# Patient Record
Sex: MALE | Race: WHITE | Employment: PART TIME | URBAN - METROPOLITAN AREA
[De-identification: names, ages, dates, MRNs, and addresses within clinical notes are randomized per-mention and may not be internally consistent; named-entity substitution may affect disease eponyms.]

---

## 2021-11-09 ENCOUNTER — OFFICE VISIT (OUTPATIENT)
Dept: FAMILY MEDICINE CLINIC | Age: 21
End: 2021-11-09
Payer: COMMERCIAL

## 2021-11-09 VITALS — TEMPERATURE: 98.3 F | WEIGHT: 135 LBS | HEIGHT: 73 IN | BODY MASS INDEX: 17.89 KG/M2

## 2021-11-09 DIAGNOSIS — J06.9 URI, ACUTE: Primary | ICD-10-CM

## 2021-11-09 DIAGNOSIS — J11.1 INFLUENZA-LIKE ILLNESS: ICD-10-CM

## 2021-11-09 LAB
INFLUENZA A ANTIBODY: NORMAL
INFLUENZA B ANTIBODY: NORMAL
Lab: NORMAL
PERFORMING INSTRUMENT: NORMAL
QC PASS/FAIL: NORMAL
S PYO AG THROAT QL: NORMAL
SARS-COV-2, POC: NORMAL

## 2021-11-09 PROCEDURE — 99441 PR PHYS/QHP TELEPHONE EVALUATION 5-10 MIN: CPT | Performed by: NURSE PRACTITIONER

## 2021-11-09 PROCEDURE — 87804 INFLUENZA ASSAY W/OPTIC: CPT | Performed by: NURSE PRACTITIONER

## 2021-11-09 PROCEDURE — 87426 SARSCOV CORONAVIRUS AG IA: CPT | Performed by: NURSE PRACTITIONER

## 2021-11-09 PROCEDURE — 87880 STREP A ASSAY W/OPTIC: CPT | Performed by: NURSE PRACTITIONER

## 2021-11-09 RX ORDER — LISDEXAMFETAMINE DIMESYLATE 60 MG/1
CAPSULE ORAL
COMMUNITY
Start: 2021-11-05

## 2021-11-09 SDOH — ECONOMIC STABILITY: FOOD INSECURITY: WITHIN THE PAST 12 MONTHS, THE FOOD YOU BOUGHT JUST DIDN'T LAST AND YOU DIDN'T HAVE MONEY TO GET MORE.: NEVER TRUE

## 2021-11-09 SDOH — ECONOMIC STABILITY: FOOD INSECURITY: WITHIN THE PAST 12 MONTHS, YOU WORRIED THAT YOUR FOOD WOULD RUN OUT BEFORE YOU GOT MONEY TO BUY MORE.: NEVER TRUE

## 2021-11-09 ASSESSMENT — PATIENT HEALTH QUESTIONNAIRE - PHQ9
SUM OF ALL RESPONSES TO PHQ QUESTIONS 1-9: 0
1. LITTLE INTEREST OR PLEASURE IN DOING THINGS: 0
SUM OF ALL RESPONSES TO PHQ QUESTIONS 1-9: 0
2. FEELING DOWN, DEPRESSED OR HOPELESS: 0
SUM OF ALL RESPONSES TO PHQ QUESTIONS 1-9: 0
SUM OF ALL RESPONSES TO PHQ9 QUESTIONS 1 & 2: 0

## 2021-11-09 ASSESSMENT — ENCOUNTER SYMPTOMS
RHINORRHEA: 0
ABDOMINAL PAIN: 0
NAUSEA: 0
COUGH: 0
SHORTNESS OF BREATH: 0
CHEST TIGHTNESS: 0
EYE PAIN: 1
DIARRHEA: 0
VOMITING: 0
SORE THROAT: 1

## 2021-11-09 ASSESSMENT — SOCIAL DETERMINANTS OF HEALTH (SDOH): HOW HARD IS IT FOR YOU TO PAY FOR THE VERY BASICS LIKE FOOD, HOUSING, MEDICAL CARE, AND HEATING?: NOT HARD AT ALL

## 2021-11-09 NOTE — PROGRESS NOTES
[address] Encounter    TELEHEALTH VISIT -- Audio Only     SUBJECTIVE:    Berkley Mahmood is a 24 y.o. male evaluated via telephone on 11/9/2021. Consent:  Jc Osman and/or health care decision maker is aware that he may receive a bill for this telephone service, depending on his insurance coverage, and has provided verbal consent to proceed: Yes    Documentation:  I communicated with the patient and/or health care decision maker about:  Chief Complaint   Patient presents with    Other     Pt states that flu like sx started on sunday with a sore throat Pt would like to be tested      URI   This is a new problem. Episode onset: 11/7/21. The problem has been waxing and waning. Maximum temperature: low grade. Associated symptoms include congestion, headaches and a sore throat. Pertinent negatives include no abdominal pain, chest pain, coughing, diarrhea, dysuria, ear pain, joint pain, nausea, plugged ear sensation, rhinorrhea, sneezing, vomiting or wheezing. Associated symptoms comments: denies loss of smell or loss of taste. He has tried increased fluids (hot tea) for the symptoms. The treatment provided mild relief. Exposure source: no sick contacts, no known exposures to COVID-19       Relevant PMH: no pertinent PMH. Non-smoker. Fully vaccinated for COVID-19 [employer]). No recent travel. Review of Systems   Constitutional: Positive for chills, fatigue and fever. HENT: Positive for congestion, postnasal drip and sore throat. Negative for ear pain, rhinorrhea, sneezing and trouble swallowing. Eyes: Positive for pain. Respiratory: Negative for cough, chest tightness, shortness of breath and wheezing. Cardiovascular: Negative for chest pain and palpitations. Gastrointestinal: Negative for abdominal pain, diarrhea, nausea and vomiting. Genitourinary: Negative for dysuria. Musculoskeletal: Positive for myalgias. Negative for joint pain.    Neurological: Positive for headaches. Negative for weakness and light-headedness. OBJECTIVE:     Vital Signs: (As obtained by: pt or caregiver)   Vitals:    11/09/21 1252   Temp: 98.3 °F (36.8 °C)   Weight: 135 lb (61.2 kg)   Height: 6' 1\" (1.854 m)     Exam: N/A  (telehealth audio visit)     POC Testing Today:   Recent Results (from the past 6 hour(s))   POCT Influenza A/B    Collection Time: 11/09/21 12:53 PM   Result Value Ref Range    Influenza A Ab NEG     Influenza B Ab NEG    POCT rapid strep A    Collection Time: 11/09/21 12:54 PM   Result Value Ref Range    Strep A Ag None Detected None Detected   POCT COVID-19, Antigen    Collection Time: 11/09/21 12:56 PM   Result Value Ref Range    SARS-COV-2, POC Not-Detected Not Detected    Lot Number [id number]     QC Pass/Fail pass     Performing Instrument [employer]      TELEHEALTH ASSESSMENT & PLAN:   Details of this discussion including any medical advice provided:     24 y.o. male with influenza like illness x 3 days. 1. URI, acute  Comments:  negative POC tests for Flu A+B, SARS-CoV-2, GAS antigens. likely other, uncomplicated viral URI. advised on supportive care measures, return precautions. Orders:  -     POCT Influenza A/B  -     POCT COVID-19, Antigen  2. Influenza-like illness  Comments:  negative rapid test for SARS-CoV-2 & Influenza A+B viral antigens. Orders:  -     POCT Influenza A/B  -     POCT COVID-19, Antigen  -     POCT rapid strep A    -      POC testing as ordered. - Analgesia and fever control with OTC acetaminophen, ibuprofen prn.  - Advised on supportive care- rest, increase oral fluid intake, salt-water gargling prn for soothing effect.  - Red flags and expected time course for resolution were reviewed. Return for follow-up as needed should symptoms worsen or fail to improve in the next 3 to 4 days.     I affirm this is a Patient Initiated Episode with an Established Patient who has not had a related appointment within my department in the past 7 days or scheduled within the next 24 hours. Total Time: minutes: 5-10 minutes    TELEHEALTH EVALUATION -- Audio/Telephone (During XEVPX-12 public health emergency)  This service was provided through telehealth, by DOMINGO Ramos CNP and the patient in his/her home via telephone call. 8:10 minutes were spent on the phone with patient. Provider performed history of present illness and review of systems. Diagnosis and treatment plan was discussed with patient. Pharmacy of choice was reviewed along with past medical history, medication allergies, and current medications. Education provided to patient or patient parents/guardian with current illness diagnosis as well as when to seek additional healthcare due to changing or for worsening symptoms. Patient voiced understanding.      Electronically signed by DOMINGO Ramos CNP on 11/9/2021 at 2:34 PM

## 2021-11-09 NOTE — PATIENT INSTRUCTIONS
1. In-office test results were negative for Influenza A+B, negative for strep, and negative for COVID-19.  2. Symptoms are likely due to a viral infection. 3. Continue supportive care measures- push fluids, rest, salt water gargles/lozenges as needed. 4. Follow-up if symptoms worsen or do not begin to improve over the next several days. Test Results:  Recent Results (from the past 4 hour(s))   POCT Influenza A/B    Collection Time: 11/09/21 12:53 PM   Result Value Ref Range    Influenza A Ab NEG     Influenza B Ab NEG    POCT rapid strep A    Collection Time: 11/09/21 12:54 PM   Result Value Ref Range    Strep A Ag None Detected None Detected   POCT COVID-19, Antigen    Collection Time: 11/09/21 12:56 PM   Result Value Ref Range    SARS-COV-2, POC Not-Detected Not Detected    Lot Number [id number]     QC Pass/Fail pass     Performing Instrument [employer]      Thank you for choosing us for your care      Patient Education        Upper Respiratory Infection (Cold): Care Instructions  Your Care Instructions     An upper respiratory infection, or URI, is an infection of the nose, sinuses, or throat. URIs are spread by coughs, sneezes, and direct contact. The common cold is the most frequent kind of URI. The flu and sinus infections are other kinds of URIs. Almost all URIs are caused by viruses. Antibiotics won't cure them. But you can treat most infections with home care. This may include drinking lots of fluids and taking over-the-counter pain medicine. You will probably feel better in 4 to 10 days. The doctor has checked you carefully, but problems can develop later. If you notice any problems or new symptoms, get medical treatment right away. Follow-up care is a key part of your treatment and safety. Be sure to make and go to all appointments, and call your doctor if you are having problems. It's also a good idea to know your test results and keep a list of the medicines you take.   How can you care for yourself at home? · To prevent dehydration, drink plenty of fluids. Choose water and other clear liquids until you feel better. If you have kidney, heart, or liver disease and have to limit fluids, talk with your doctor before you increase the amount of fluids you drink. · Take an over-the-counter pain medicine, such as acetaminophen (Tylenol), ibuprofen (Advil, Motrin), or naproxen (Aleve). Read and follow all instructions on the label. · Before you use cough and cold medicines, check the label. These medicines may not be safe for young children or for people with certain health problems. · Be careful when taking over-the-counter cold or flu medicines and Tylenol at the same time. Many of these medicines have acetaminophen, which is Tylenol. Read the labels to make sure that you are not taking more than the recommended dose. Too much acetaminophen (Tylenol) can be harmful. · Get plenty of rest.  · Do not smoke or allow others to smoke around you. If you need help quitting, talk to your doctor about stop-smoking programs and medicines. These can increase your chances of quitting for good. When should you call for help? Call 911 anytime you think you may need emergency care. For example, call if:    · You have severe trouble breathing. Call your doctor now or seek immediate medical care if:    · You seem to be getting much sicker.     · You have new or worse trouble breathing.     · You have a new or higher fever.     · You have a new rash. Watch closely for changes in your health, and be sure to contact your doctor if:    · You have a new symptom, such as a sore throat, an earache, or sinus pain.     · You cough more deeply or more often, especially if you notice more mucus or a change in the color of your mucus.     · You do not get better as expected. Where can you learn more? Go to https://[email or web address].[email or web address] org and sign in to your [employer] account.  Enter E542 in the [employer] box to learn more about \"Upper Respiratory Infection (Cold): Care Instructions. \"     If you do not have an account, please click on the \"Sign Up Now\" link. Current as of: July 6, 2021               Content Version: 13.1  © 1020-7173 [employer]. Care instructions adapted under license by [facility] ([facility]). If you have questions about a medical condition or this instruction, always ask your healthcare professional. [address] any warranty or liability for your use of this information.

## 2022-01-13 ASSESSMENT — ENCOUNTER SYMPTOMS
TROUBLE SWALLOWING: 0
WHEEZING: 0